# Patient Record
Sex: MALE | Race: WHITE | NOT HISPANIC OR LATINO | Employment: OTHER | URBAN - METROPOLITAN AREA
[De-identification: names, ages, dates, MRNs, and addresses within clinical notes are randomized per-mention and may not be internally consistent; named-entity substitution may affect disease eponyms.]

---

## 2017-05-17 NOTE — PATIENT DISCUSSION
POSTERIOR CAPSULAR FIBROSIS, OU- VISUALLY SIGNIFICANT. SCHEDULE YAG CAPSULOTOMY OS FIRST THEN LATER YAG CAPSULOTOMY OD IF VISUAL SYMPTOMS PERSIST.

## 2017-05-17 NOTE — PATIENT DISCUSSION
POSTERIOR CAPSULAR FIBROSIS, OU- NOT VISUALLY SIGNIFICANT. SCHEDULE YAG CAPSULOTOMY IF VISUAL SYMPTOMS DEVELOP.

## 2018-08-03 ENCOUNTER — ESTABLISHED COMPREHENSIVE EXAM (OUTPATIENT)
Dept: URBAN - METROPOLITAN AREA CLINIC 43 | Facility: CLINIC | Age: 58
End: 2018-08-03

## 2018-08-03 DIAGNOSIS — H43.813: ICD-10-CM

## 2018-08-03 PROCEDURE — 92014 COMPRE OPH EXAM EST PT 1/>: CPT

## 2018-08-03 PROCEDURE — 92015 DETERMINE REFRACTIVE STATE: CPT

## 2018-08-03 ASSESSMENT — TONOMETRY
OD_IOP_MMHG: 12
OS_IOP_MMHG: 12

## 2018-08-03 ASSESSMENT — VISUAL ACUITY
OD_CC: J6
OD_SC: <J12
OS_SC: 20/30-2
OD_SC: 20/50
OS_CC: J2
OS_SC: J12

## 2019-08-05 ENCOUNTER — ESTABLISHED COMPREHENSIVE EXAM (OUTPATIENT)
Dept: URBAN - METROPOLITAN AREA CLINIC 43 | Facility: CLINIC | Age: 59
End: 2019-08-05

## 2019-08-05 DIAGNOSIS — H43.813: ICD-10-CM

## 2019-08-05 DIAGNOSIS — H52.4: ICD-10-CM

## 2019-08-05 DIAGNOSIS — H52.03: ICD-10-CM

## 2019-08-05 PROCEDURE — 92015 DETERMINE REFRACTIVE STATE: CPT

## 2019-08-05 PROCEDURE — 92014 COMPRE OPH EXAM EST PT 1/>: CPT

## 2019-08-05 ASSESSMENT — VISUAL ACUITY
OS_SC: 20/40-
OS_CC: 20/20-
OD_CC: J3
OD_CC: 20/20
OD_SC: >J12
OD_SC: 20/60
OS_CC: J1-
OS_SC: >J12

## 2019-08-05 ASSESSMENT — TONOMETRY
OD_IOP_MMHG: 16
OS_IOP_MMHG: 16

## 2021-09-15 NOTE — PATIENT DISCUSSION
this condition. I have reviewed the risks, benefits and alternatives of YAG laser surgery for the treatment of the fibrosis.  The uncommon risk of an

## 2021-09-15 NOTE — PATIENT DISCUSSION
capsular opacification (PCO), was discussed with the patient.  The patient understands that their symptoms and limitations are likely related to

## 2021-09-15 NOTE — PATIENT DISCUSSION
the office if new symptoms of persistent blurring or distortion of vision arise as evaluation and possible treatment is necessary to preserve as much

## 2021-09-15 NOTE — PATIENT DISCUSSION
systemic illnesses such as Sjogren's disease or rheumatoid arthritis may contribute to severity.  Vision may be limited by dry eye, and symptoms

## 2021-09-15 NOTE — PATIENT DISCUSSION
Posterior Capsular Fibrosis Counseling:  The diagnosis of posterior capsular fibrosis (PCF), also referred to as a secondary cataract or posterior

## 2021-09-15 NOTE — PATIENT DISCUSSION
increase in intraocular pressure or a retinal detachment and their associated symptoms were explained to the patient.  The patient understands and

## 2021-09-15 NOTE — PATIENT DISCUSSION
POSTERIOR CAPSULAR FIBROSIS, OU- STARTING TO BECOME VISUALLY SIGNIFICANT.  SCHEDULE YAG IF BECOMES BOTHERSOM

## 2021-09-15 NOTE — PATIENT DISCUSSION
AMD (Dry) Counseling: I have instructed the patient to take an AREDS 2 vitamin mixture to minimize the risk of disease progression.  The importance

## 2021-09-15 NOTE — PATIENT DISCUSSION
of daily monitoring with Amsler grid was emphasized and an Amsler grid was provided if the patient did not have one.  The patient was advised to call

## 2022-03-31 ENCOUNTER — COMPREHENSIVE EXAM (OUTPATIENT)
Dept: URBAN - METROPOLITAN AREA CLINIC 43 | Facility: CLINIC | Age: 62
End: 2022-03-31

## 2022-03-31 DIAGNOSIS — H52.4: ICD-10-CM

## 2022-03-31 DIAGNOSIS — H52.03: ICD-10-CM

## 2022-03-31 PROCEDURE — 92015 DETERMINE REFRACTIVE STATE: CPT

## 2022-03-31 PROCEDURE — 92014 COMPRE OPH EXAM EST PT 1/>: CPT

## 2022-03-31 ASSESSMENT — VISUAL ACUITY
OS_CC: 20/30+2
OU_CC: 20/20-1
OU_SC: 20/40-2
OS_CC: J3
OS_SC: 20/50-2
OU_CC: J1
OD_CC: J6
OD_CC: 20/30
OD_SC: 20/100

## 2022-03-31 ASSESSMENT — TONOMETRY
OS_IOP_MMHG: 12
OD_IOP_MMHG: 11

## 2023-07-07 ENCOUNTER — COMPREHENSIVE EXAM (OUTPATIENT)
Dept: URBAN - METROPOLITAN AREA CLINIC 43 | Facility: CLINIC | Age: 63
End: 2023-07-07

## 2023-07-07 DIAGNOSIS — H52.03: ICD-10-CM

## 2023-07-07 DIAGNOSIS — H52.4: ICD-10-CM

## 2023-07-07 PROCEDURE — 92014 COMPRE OPH EXAM EST PT 1/>: CPT

## 2023-07-07 PROCEDURE — 92015 DETERMINE REFRACTIVE STATE: CPT

## 2023-07-07 ASSESSMENT — TONOMETRY
OD_IOP_MMHG: 11
OS_IOP_MMHG: 11

## 2023-07-07 ASSESSMENT — VISUAL ACUITY
OS_CC: 20/30-2
OD_CC: J2
OU_CC: 20/20
OU_CC: J1
OD_SC: 20/80
OU_SC: 20/30
OS_CC: J1
OD_CC: 20/25+2
OS_SC: 20/40

## 2024-01-26 NOTE — PATIENT DISCUSSION
[facility]   IN-PATIENT SERVICE       HISTORY AND PHYSICAL EXAMINATION            Date:   1/25/2024  Patient name:  Rod Rodriguez  Date of admission:  1/25/2024  5:41 PM  MRN:   [id number]  Account:  [id number]  YOB: 1936  PCP:    Esme Sullivan MD  Room:   12/12  Code Status:    Full Code    Chief Complaint:     Chief Complaint   Patient presents with    Fatigue    Fall       History Obtained From:     patient, electronic medical record    History of Present Illness:   87-year-old male with a past medical history of BPH, hyperlipidemia, and hypertension presents to the emergency department for generalized fatigue for couple days.  He also complains of right shoulder pain that has been chronic for several weeks for patient was admitted to the hospital from December 17 December 20 for UTI with sepsis.  Patient also was recently treated with antibiotics and finished on December 31 for UTI.  In the ED, patient was found to have UTI, leukocytosis, fever, and [place].  Furthermore, CT abdomen shows 8 mm distal left ureteral calculus without hydronephrosis.  Also, patient was found to have diverticulosis and focal wall thickening of the sigmoid colon concerning for mass.  Patient denies nausea, vomiting, diarrhea, shortness of breath, cough, headache, numbness, or tingling    Past Medical History:     Past Medical History:   Diagnosis Date    Aortic valve disease     Arthritis     BPH (benign prostatic hyperplasia)     Coronary artery vasospasm (HCC)     Diverticulosis     H/O squamous cell carcinoma of skin     Hematuria 7/26/2017    HTN (hypertension)     Hyperlipidemia     Mixed hyperlipidemia 1/7/2016    Osteoarthritis of right knee     PSA elevation         Past Surgical History:     Past Surgical History:   Procedure Laterality Date    APPENDECTOMY  1970    CARDIAC PACEMAKER PLACEMENT  12/2016    [facility]    COSMETIC SURGERY  2010    skin cancer - base of nose     have been reviewed with the patient.  Daily prescribed and over the counter medications, along with their potential contributions to dry eye symptoms,